# Patient Record
Sex: FEMALE | Race: WHITE | NOT HISPANIC OR LATINO | URBAN - METROPOLITAN AREA
[De-identification: names, ages, dates, MRNs, and addresses within clinical notes are randomized per-mention and may not be internally consistent; named-entity substitution may affect disease eponyms.]

---

## 2017-01-12 DIAGNOSIS — H53.10 SUBJECTIVE VISUAL DISTURBANCE: Primary | ICD-10-CM

## 2017-02-16 ENCOUNTER — OFFICE VISIT (OUTPATIENT)
Dept: OPHTHALMOLOGY | Facility: CLINIC | Age: 37
End: 2017-02-16
Attending: OPHTHALMOLOGY
Payer: MEDICARE

## 2017-02-16 DIAGNOSIS — H47.329 DRUSEN OF OPTIC DISC, UNSPECIFIED LATERALITY: Primary | ICD-10-CM

## 2017-02-16 DIAGNOSIS — H53.10 SUBJECTIVE VISUAL DISTURBANCE: ICD-10-CM

## 2017-02-16 PROCEDURE — 92083 EXTENDED VISUAL FIELD XM: CPT | Mod: ZF | Performed by: OPHTHALMOLOGY

## 2017-02-16 PROCEDURE — 99214 OFFICE O/P EST MOD 30 MIN: CPT | Mod: ZF

## 2017-02-16 PROCEDURE — 92133 CPTRZD OPH DX IMG PST SGM ON: CPT | Mod: ZF | Performed by: OPHTHALMOLOGY

## 2017-02-16 PROCEDURE — 92015 DETERMINE REFRACTIVE STATE: CPT | Mod: GY,ZF

## 2017-02-16 RX ORDER — VITAMIN E 268 MG
800 CAPSULE ORAL
COMMUNITY
Start: 2016-04-05

## 2017-02-16 RX ORDER — DROSPIRENONE AND ETHINYL ESTRADIOL 0.03MG-3MG
KIT ORAL
COMMUNITY
Start: 2016-08-19

## 2017-02-16 RX ORDER — EMOLLIENT BASE
CREAM (GRAM) TOPICAL
COMMUNITY
Start: 2013-11-15

## 2017-02-16 RX ORDER — DOCUSATE SODIUM 100 MG/1
CAPSULE, LIQUID FILLED ORAL
COMMUNITY
Start: 2016-08-15

## 2017-02-16 RX ORDER — ALBUTEROL SULFATE 0.83 MG/ML
2.5 SOLUTION RESPIRATORY (INHALATION)
COMMUNITY
Start: 2016-09-30

## 2017-02-16 RX ORDER — PROPRANOLOL HYDROCHLORIDE 10 MG/1
TABLET ORAL
COMMUNITY
Start: 2016-12-05

## 2017-02-16 RX ORDER — BACLOFEN 10 MG/1
10 TABLET ORAL
COMMUNITY
Start: 2016-02-05

## 2017-02-16 RX ORDER — SENNOSIDES 8.6 MG
TABLET ORAL
COMMUNITY
Start: 2016-08-23

## 2017-02-16 RX ORDER — CEFUROXIME AXETIL 250 MG/1
6 TABLET ORAL
COMMUNITY
Start: 2016-09-24

## 2017-02-16 RX ORDER — QUETIAPINE 150 MG/1
TABLET, FILM COATED, EXTENDED RELEASE ORAL
COMMUNITY
Start: 2017-01-11 | End: 2018-03-19

## 2017-02-16 RX ORDER — PHENELZINE SULFATE 15 MG/1
TABLET ORAL
COMMUNITY
Start: 2016-11-29

## 2017-02-16 RX ORDER — GABAPENTIN 300 MG/1
CAPSULE ORAL
COMMUNITY
Start: 2016-11-21

## 2017-02-16 RX ORDER — PRAVASTATIN SODIUM 40 MG
TABLET ORAL
COMMUNITY
Start: 2016-09-12

## 2017-02-16 RX ORDER — QUETIAPINE 300 MG/1
TABLET, FILM COATED, EXTENDED RELEASE ORAL
COMMUNITY
Start: 2017-01-11 | End: 2018-03-19

## 2017-02-16 RX ORDER — LEVOTHYROXINE SODIUM 75 UG/1
TABLET ORAL
COMMUNITY
Start: 2016-04-12

## 2017-02-16 RX ORDER — CYANOCOBALAMIN 1000 UG/ML
1000 INJECTION, SOLUTION INTRAMUSCULAR; SUBCUTANEOUS
COMMUNITY
Start: 2014-01-10

## 2017-02-16 RX ORDER — LORATADINE 10 MG/1
TABLET ORAL
COMMUNITY
Start: 2017-01-26 | End: 2018-03-19

## 2017-02-16 RX ORDER — QUETIAPINE 200 MG/1
TABLET, FILM COATED, EXTENDED RELEASE ORAL
COMMUNITY
Start: 2017-01-16 | End: 2018-03-19

## 2017-02-16 RX ORDER — LITHIUM CARBONATE 150 MG/1
CAPSULE ORAL
COMMUNITY
Start: 2016-11-29 | End: 2018-03-19

## 2017-02-16 RX ORDER — FLUTICASONE PROPIONATE 110 UG/1
1 AEROSOL, METERED RESPIRATORY (INHALATION)
COMMUNITY
Start: 2016-09-24

## 2017-02-16 RX ORDER — MELOXICAM 15 MG/1
TABLET ORAL
COMMUNITY
Start: 2016-09-10

## 2017-02-16 RX ORDER — CLOBETASOL PROPIONATE 0.5 MG/G
CREAM TOPICAL
COMMUNITY
Start: 2017-01-10

## 2017-02-16 ASSESSMENT — SLIT LAMP EXAM - LIDS
COMMENTS: NORMAL
COMMENTS: NORMAL

## 2017-02-16 ASSESSMENT — REFRACTION_WEARINGRX
OS_ADD: +2.25
OS_AXIS: 084
OS_CYLINDER: +0.50
OD_ADD: +2.25
OD_CYLINDER: +0.75
OD_SPHERE: -4.50
OD_AXIS: 083
OS_SPHERE: -4.25

## 2017-02-16 ASSESSMENT — REFRACTION_MANIFEST
OS_CYLINDER: +0.25
OD_CYLINDER: +1.00
OS_SPHERE: -4.25
OS_AXIS: 090
OD_SPHERE: -4.00
OD_AXIS: 085

## 2017-02-16 ASSESSMENT — TONOMETRY
OD_IOP_MMHG: 11
OS_IOP_MMHG: 10
IOP_METHOD: TONOPEN

## 2017-02-16 ASSESSMENT — CONF VISUAL FIELD
OD_INFERIOR_TEMPORAL_RESTRICTION: 1
OS_INFERIOR_NASAL_RESTRICTION: 3
OS_SUPERIOR_NASAL_RESTRICTION: 3
OS_INFERIOR_TEMPORAL_RESTRICTION: 3
OD_SUPERIOR_TEMPORAL_RESTRICTION: 1
OS_SUPERIOR_TEMPORAL_RESTRICTION: 3

## 2017-02-16 ASSESSMENT — EXTERNAL EXAM - RIGHT EYE: OD_EXAM: NORMAL

## 2017-02-16 ASSESSMENT — VISUAL ACUITY
METHOD: SNELLEN - LINEAR
OS_CC: 20/400
OD_CC: 20/400

## 2017-02-16 ASSESSMENT — EXTERNAL EXAM - LEFT EYE: OS_EXAM: NORMAL

## 2017-02-16 NOTE — NURSING NOTE
Chief Complaints and History of Present Illnesses   Patient presents with     Neurologic Problem     ON drusen     HPI    Affected eye(s):  Both   Symptoms:        Frequency:  Intermittent       Do you have eye pain now?:  No      Comments:  Alexandra Light is a 36 year old female with the following diagnoses:     1. Drusen of optic disc, unspecified laterality     2. Blurry vision   - Constant  - Current Rx over 1 year old.   - otherwise, no new concerns.     3. History of migraines  - Had b[facility] injections last Friday ([facility]   - Botox helped, but still c/o headaches.   - Does not sleep much. Sometimes goes 3-4 days without sleeping, and when she sleeps, it's only an hour.       Nai CANTU 1:54 PM February 16, 2017

## 2017-02-16 NOTE — MR AVS SNAPSHOT
After Visit Summary   2/16/2017    Alexandra Light    MRN: [id number]           Patient Information     Date Of Birth          1980        Visit Information        Provider Department      2/16/2017 2:00 PM aNin Danielle MD [facility]        Today's Diagnoses     Drusen of optic disc, unspecified laterality    -  1    Subjective visual disturbance           Follow-ups after your visit        Your next 10 appointments already scheduled     Feb 15, 2018  2:00 PM CST   RETURN NEURO with Nain Danielle MD   Eye Clinic ([facility])    [address]  [address]  [facility] [address]   [phone]              Who to contact     Please call your clinic at [phone] to:    Ask questions about your health    Make or cancel appointments    Discuss your medicines    Learn about your test results    Speak to your doctor   If you have compliments or concerns about an experience at your clinic, or if you wish to file a complaint, please contact [facility] Patient Relations at [phone] or email us at [email or web address]         Additional Information About Your Visit        MyChart Information     [employer] gives you secure access to your electronic health record. If you see a primary care provider, you can also send messages to your care team and make appointments. If you have questions, please call your primary care clinic.  If you do not have a primary care provider, please call [phone] and they will assist you.      [employer] is an electronic gateway that provides easy, online access to your medical records. With [employer], you can request a clinic appointment, read your test results, renew a prescription or communicate with your care team.     To access your existing account, please contact your [facility] or call [phone] for assistance.        Care EveryWhere ID      This is your Care EveryWhere ID. This could be used by other organizations to access your [facility] medical records  [id number]         Blood Pressure from Last 3 Encounters:   No data found for BP    Weight from Last 3 Encounters:   No data found for Wt              We Performed the Following     Color Vision - Screening OU (both eyes)     IOP Measurement     Low Vision Central OU     OCT Optic Nerve RNFL Spectralis OU (both eyes)          Today's Medication Changes          These changes are accurate as of: 2/16/17  3:45 PM.  If you have any questions, ask your nurse or doctor.               These medicines have changed or have updated prescriptions.        Dose/Directions    albuterol (2.5 MG/3ML) 0.083% neb solution   This may have changed:  Another medication with the same name was removed. Continue taking this medication, and follow the directions you see here.        Dose:  2.5 mg   Inhale 2.5 mg into the lungs   Refills:  0       ARIPiprazole 400 MG extended release injection   Commonly known as:  ABILIFY MAINTENA   This may have changed:  Another medication with the same name was removed. Continue taking this medication, and follow the directions you see here.        Dose:  300 mg   Inject 300 mg into the muscle   Refills:  0       cholecalciferol 1000 UNIT tablet   Commonly known as:  vitamin D   This may have changed:  Another medication with the same name was removed. Continue taking this medication, and follow the directions you see here.        TAKE ONE TABLET IN THE MORNING   Refills:  0       clobetasol 0.05 % cream   Commonly known as:  TEMOVATE   This may have changed:  Another medication with the same name was removed. Continue taking this medication, and follow the directions you see here.        Refills:  0       cyanocobalamin 1000 MCG/ML injection   Commonly known as:  VITAMIN B12   This may have changed:  Another medication with the same name was removed. Continue taking this medication, and  follow the directions you see here.        Dose:  1000 mcg   Inject 1,000 mcg into the muscle   Refills:  0       docusate sodium 100 MG capsule   Commonly known as:  COLACE   This may have changed:  Another medication with the same name was removed. Continue taking this medication, and follow the directions you see here.        TAKE ONE CAPSULE IN THE MORNING and TAKE ONE CAPSULE AT BEDTIME   Refills:  0       drospirenone-ethinyl estradiol 3-0.03 MG per tablet   Commonly known as:  GILBERT   This may have changed:  Another medication with the same name was removed. Continue taking this medication, and follow the directions you see here.        Take 1 Tab by mouth one time a day. ok TO SKIP THE placebo pills   Refills:  0       emollient cream   This may have changed:  Another medication with the same name was removed. Continue taking this medication, and follow the directions you see here.        Refills:  0       fluticasone 110 MCG/ACT Inhaler   Commonly known as:  FLOVENT HFA   This may have changed:  Another medication with the same name was removed. Continue taking this medication, and follow the directions you see here.        Dose:  1 puff   Inhale 1 puff into the lungs   Refills:  0       FULL KIT NEBULIZER SET Misc   This may have changed:  Another medication with the same name was removed. Continue taking this medication, and follow the directions you see here.        Refills:  0       GLYCERIN-POLYSORBATE 80 OP   This may have changed:  Another medication with the same name was removed. Continue taking this medication, and follow the directions you see here.        Dose:  2 drop   Inject 2 drops into the eye   Refills:  0       levothyroxine 75 MCG tablet   Commonly known as:  SYNTHROID/LEVOTHROID   This may have changed:  Another medication with the same name was removed. Continue taking this medication, and follow the directions you see here.        TAKE ONE TABLET IN THE MORNING   Refills:  0        lithium 150 MG capsule   This may have changed:  Another medication with the same name was removed. Continue taking this medication, and follow the directions you see here.        TAKE ONE CAPSULE IN THE EVENING and TAKE ONE CAPSULE AT BEDTIME WITH MEALS.   Refills:  0       loratadine 10 MG tablet   Commonly known as:  CLARITIN   This may have changed:  Another medication with the same name was removed. Continue taking this medication, and follow the directions you see here.        TAKE ONE TABLET IN THE MORNING   Refills:  0       metFORMIN 500 MG tablet   Commonly known as:  GLUCOPHAGE   This may have changed:  Another medication with the same name was removed. Continue taking this medication, and follow the directions you see here.        Dose:  250 mg   Take 250 mg by mouth   Refills:  0       omeprazole 20 MG CR capsule   Commonly known as:  priLOSEC   This may have changed:  Another medication with the same name was removed. Continue taking this medication, and follow the directions you see here.        TAKE ONE CAPSULE IN THE MORNING and TAKE ONE CAPSULE IN THE EVENING   Refills:  0       phenelzine 15 MG tablet   Commonly known as:  NARDIL   This may have changed:  Another medication with the same name was removed. Continue taking this medication, and follow the directions you see here.        TAKE ONE TABLET IN THE MORNING and TAKE ONE TABLET AT NOON and TAKE TWO TABLETS AT BEDTIME   Refills:  0       pravastatin 40 MG tablet   Commonly known as:  PRAVACHOL   This may have changed:  Another medication with the same name was removed. Continue taking this medication, and follow the directions you see here.        TAKE ONE TABLET AT BEDTIME   Refills:  0       propranolol 10 MG tablet   Commonly known as:  INDERAL   This may have changed:  Another medication with the same name was removed. Continue taking this medication, and follow the directions you see here.        TAKE 1-2 TABLETS THREE TIMES DAILY AS  NEEDED FOR ANXIETY OR PALPITATIONS AS LONG AS BLOOD PRESSURE IS OK.   Refills:  0       sennosides 8.6 MG tablet   Commonly known as:  SENOKOT   This may have changed:  Another medication with the same name was removed. Continue taking this medication, and follow the directions you see here.        TAKE ONE TABLET IN THE MORNING and TAKE ONE TABLET IN THE EVENING   Refills:  0       * SEROQUEL  MG Tb24 24 hr tablet   This may have changed:  Another medication with the same name was removed. Continue taking this medication, and follow the directions you see here.   Generic drug:  QUEtiapine        TAKE ONE TABLET AT BEDTIME WITH TWO 300MG TABS FOR TOTAL DOSE OF 750MG   Refills:  0       * SEROQUEL  MG 24 hr tablet   This may have changed:  Another medication with the same name was removed. Continue taking this medication, and follow the directions you see here.   Generic drug:  QUEtiapine        TAKE TWO TABLETS AT BEDTIME WITH 150MG TAB FOR TOTAL DOSE OF 750MG   Refills:  0       * SEROQUEL  MG 24 hr tablet   This may have changed:  Another medication with the same name was removed. Continue taking this medication, and follow the directions you see here.   Generic drug:  QUEtiapine        TAKE ONE TABLET IN THE MORNING .SWALLOW WHOLE; DO NOT BREAK,CRUSH,CHEW.   Refills:  0       SUMAtriptan 6 MG/0.5ML pen injector kit   Commonly known as:  IMITREX STATDOSE   This may have changed:  Another medication with the same name was removed. Continue taking this medication, and follow the directions you see here.        Dose:  6 mg   Inject 6 mg Subcutaneous   Refills:  0       * Notice:  This list has 3 medication(s) that are the same as other medications prescribed for you. Read the directions carefully, and ask your doctor or other care provider to review them with you.      Stop taking these medicines if you haven't already. Please contact your care team if you have questions.     carboxymethylcellulose  1 % ophthalmic solution   Commonly known as:  CELLUVISC/REFRESH LIQUIGEL           fish oil-omega-3 fatty acids 1000 MG capsule           gabapentin 600 MG 24 hr tablet   Commonly known as:  GRALISE           HYDROcodone-acetaminophen 5-325 MG per tablet   Commonly known as:  NORCO           lidocaine 5 % Patch   Commonly known as:  LIDODERM           magnesium 100 MG Tabs           MODAFINIL PO           Multi-vitamin Tabs tablet           NABUMETONE PO           orphenadrine 100 MG 12 hr tablet   Commonly known as:  NORFLEX           pirbuterol 200 MCG/INH Inhaler   Commonly known as:  MAXAIR           senna-docusate 8.6-50 MG per tablet   Commonly known as:  SENOKOT-S;PERICOLACE           Sodium Fluoride 1.1 % Crea           VISTARIL 25 MG capsule   Generic drug:  hydrOXYzine                    Primary Care Provider Office Phone # Fax #    Violetta Enriquez [phone]       [facility] [address] [address]        Thank you!     Thank you for choosing [facility]  for your care. Our goal is always to provide you with excellent care. Hearing back from our patients is one way we can continue to improve our services. Please take a few minutes to complete the written survey that you may receive in the mail after your visit with us. Thank you!             Your Updated Medication List - Protect others around you: Learn how to safely use, store and throw away your medicines at www.disposemymeds.org.          This list is accurate as of: 2/16/17  3:45 PM.  Always use your most recent med list.                   Brand Name Dispense Instructions for use    albuterol (2.5 MG/3ML) 0.083% neb solution      Inhale 2.5 mg into the lungs       ARIPiprazole 400 MG extended release injection    ABILIFY MAINTENA     Inject 300 mg into the muscle       baclofen 10 MG tablet    LIORESAL     Take 10 mg by mouth       cholecalciferol 1000 UNIT tablet    vitamin D     TAKE ONE TABLET  IN THE MORNING       clobetasol 0.05 % cream    TEMOVATE         cyanocobalamin 1000 MCG/ML injection    VITAMIN B12     Inject 1,000 mcg into the muscle       docusate sodium 100 MG capsule    COLACE     TAKE ONE CAPSULE IN THE MORNING and TAKE ONE CAPSULE AT BEDTIME       drospirenone-ethinyl estradiol 3-0.03 MG per tablet    GILBERT     Take 1 Tab by mouth one time a day. ok TO SKIP THE placebo pills       emollient cream          fluticasone 110 MCG/ACT Inhaler    FLOVENT HFA     Inhale 1 puff into the lungs       FULL KIT NEBULIZER SET Misc          gabapentin 300 MG capsule    NEURONTIN     TAKE FOUR CAPSULES IN THE MORNING and TAKE FOUR CAPSULES IN THE EVENING and TAKE FOUR CAPSULES AT BEDTIME       GLYCERIN-POLYSORBATE 80 OP      Inject 2 drops into the eye       levothyroxine 75 MCG tablet    SYNTHROID/LEVOTHROID     TAKE ONE TABLET IN THE MORNING       lithium 150 MG capsule      TAKE ONE CAPSULE IN THE EVENING and TAKE ONE CAPSULE AT BEDTIME WITH MEALS.       loratadine 10 MG tablet    CLARITIN     TAKE ONE TABLET IN THE MORNING       MAGNESIUM PO          meloxicam 15 MG tablet    MOBIC     TAKE ONE TABLET EVERY DAY WITH FOOD       metFORMIN 500 MG tablet    GLUCOPHAGE     Take 250 mg by mouth       omeprazole 20 MG CR capsule    priLOSEC     TAKE ONE CAPSULE IN THE MORNING and TAKE ONE CAPSULE IN THE EVENING       phenelzine 15 MG tablet    NARDIL     TAKE ONE TABLET IN THE MORNING and TAKE ONE TABLET AT NOON and TAKE TWO TABLETS AT BEDTIME       pravastatin 40 MG tablet    PRAVACHOL     TAKE ONE TABLET AT BEDTIME       propranolol 10 MG tablet    INDERAL     TAKE 1-2 TABLETS THREE TIMES DAILY AS NEEDED FOR ANXIETY OR PALPITATIONS AS LONG AS BLOOD PRESSURE IS OK.       sennosides 8.6 MG tablet    SENOKOT     TAKE ONE TABLET IN THE MORNING and TAKE ONE TABLET IN THE EVENING       * SEROQUEL  MG Tb24 24 hr tablet   Generic drug:  QUEtiapine      TAKE ONE TABLET AT BEDTIME WITH TWO 300MG TABS FOR  TOTAL DOSE OF 750MG       * SEROQUEL  MG 24 hr tablet   Generic drug:  QUEtiapine      TAKE TWO TABLETS AT BEDTIME WITH 150MG TAB FOR TOTAL DOSE OF 750MG       * SEROQUEL  MG 24 hr tablet   Generic drug:  QUEtiapine      TAKE ONE TABLET IN THE MORNING .SWALLOW WHOLE; DO NOT BREAK,CRUSH,CHEW.       SUMAtriptan 6 MG/0.5ML pen injector kit    IMITREX STATDOSE     Inject 6 mg Subcutaneous       vitamin E 400 UNIT capsule      Take 800 Units by mouth       * Notice:  This list has 3 medication(s) that are the same as other medications prescribed for you. Read the directions carefully, and ask your doctor or other care provider to review them with you.

## 2017-02-16 NOTE — PROGRESS NOTES
Assessment & Plan     Alexandra Light is a 36 year old female with the following diagnoses:   1. Drusen of optic disc, unspecified laterality    2. Subjective visual disturbance         The patient reports no change in her vision from last exam, although her vision appear worse in both eyes compared to last visit but better that her prior visit in Apr 2015.       Her examination today showed no afferent or efferent defect. Fundus exam is unremarkable.     She has nonorganic vision loss. Continued reassurance.     Attending Physician Attestation:  I have seen and examined this patient.  I have confirmed and edited as necessary the chief complaint(s), history of present illness, review of systems, relevant history, and examination findings as documented by others.  I have personally reviewed the relevant tests, images, and reports as documented above.  I have confirmed and edited as necessary the assessment and plan and agree with this note.  - Nain Danielle MD 4:00 PM 2/16/2017

## 2017-09-10 ENCOUNTER — HEALTH MAINTENANCE LETTER (OUTPATIENT)
Age: 37
End: 2017-09-10

## 2018-03-15 DIAGNOSIS — H53.10 SUBJECTIVE VISUAL DISTURBANCE: Primary | ICD-10-CM

## 2018-03-19 ENCOUNTER — OFFICE VISIT (OUTPATIENT)
Dept: OPHTHALMOLOGY | Facility: CLINIC | Age: 38
End: 2018-03-19
Attending: OPHTHALMOLOGY
Payer: MEDICARE

## 2018-03-19 DIAGNOSIS — H47.329 DRUSEN OF OPTIC DISC, UNSPECIFIED LATERALITY: Primary | ICD-10-CM

## 2018-03-19 DIAGNOSIS — H53.10 SUBJECTIVE VISUAL DISTURBANCE: ICD-10-CM

## 2018-03-19 PROCEDURE — 92083 EXTENDED VISUAL FIELD XM: CPT | Mod: ZF | Performed by: OPHTHALMOLOGY

## 2018-03-19 PROCEDURE — 92133 CPTRZD OPH DX IMG PST SGM ON: CPT | Mod: ZF | Performed by: OPHTHALMOLOGY

## 2018-03-19 PROCEDURE — G0463 HOSPITAL OUTPT CLINIC VISIT: HCPCS | Mod: ZF

## 2018-03-19 ASSESSMENT — REFRACTION_WEARINGRX
OD_CYLINDER: +0.75
OS_AXIS: 084
OD_AXIS: 083
OD_ADD: +2.25
OD_SPHERE: -4.50
OS_ADD: +2.25
OS_CYLINDER: +0.50
OS_SPHERE: -4.25

## 2018-03-19 ASSESSMENT — CONF VISUAL FIELD
OD_SUPERIOR_NASAL_RESTRICTION: 3
OS_INFERIOR_NASAL_RESTRICTION: 3
OS_INFERIOR_TEMPORAL_RESTRICTION: 3
OS_SUPERIOR_NASAL_RESTRICTION: 3
OD_INFERIOR_TEMPORAL_RESTRICTION: 1
OD_SUPERIOR_TEMPORAL_RESTRICTION: 1
OS_SUPERIOR_TEMPORAL_RESTRICTION: 3
OD_INFERIOR_NASAL_RESTRICTION: 3

## 2018-03-19 ASSESSMENT — TONOMETRY
IOP_METHOD: ICARE
OS_IOP_MMHG: 9
OD_IOP_MMHG: 9

## 2018-03-19 ASSESSMENT — VISUAL ACUITY
CORRECTION_TYPE: GLASSES
METHOD: SNELLEN - LINEAR
OD_CC: 20/400
OS_CC: 20/500

## 2018-03-19 ASSESSMENT — SLIT LAMP EXAM - LIDS
COMMENTS: NORMAL
COMMENTS: NORMAL

## 2018-03-19 ASSESSMENT — EXTERNAL EXAM - RIGHT EYE: OD_EXAM: NORMAL

## 2018-03-19 ASSESSMENT — EXTERNAL EXAM - LEFT EYE: OS_EXAM: NORMAL

## 2018-03-19 NOTE — NURSING NOTE
Chief Complaints and History of Present Illnesses   Patient presents with     Neurologic Problem     annual exam      HPI    Symptoms:              Comments:  Alexandra Light is a 37 year old female with the following diagnoses:   1. Drusen of optic disc, unspecified laterality   2. Subjective visual disturbance     Feels no significant change in vision since the last visit. Some days the vision appears worse.     Nai CANTU 1:08 PM March 19, 2018

## 2018-03-19 NOTE — MR AVS SNAPSHOT
After Visit Summary   3/19/2018    Alexandra Light    MRN: [id number]           Patient Information     Date Of Birth          1980        Visit Information        Provider Department      3/19/2018 1:00 PM Nain Danielle MD [facility]        Today's Diagnoses     Drusen of optic disc, unspecified laterality    -  1    Subjective visual disturbance           Follow-ups after your visit        Additional Services     OCCUPATIONAL THERAPY REFERRAL       Low Vision Referral to Tatiana Loera                  Your next 10 appointments already scheduled     Mar 19, 2019  1:00 PM CDT   RETURN NEURO with Nain Danielle MD   Eye Clinic ([facility])    [address]  [facility] [address]   [phone]              Who to contact     Please call your clinic at [phone] to:    Ask questions about your health    Make or cancel appointments    Discuss your medicines    Learn about your test results    Speak to your doctor            Additional Information About Your Visit        MyChart Information     [employer] gives you secure access to your electronic health record. If you see a primary care provider, you can also send messages to your care team and make appointments. If you have questions, please call your primary care clinic.  If you do not have a primary care provider, please call [phone] and they will assist you.      [employer] is an electronic gateway that provides easy, online access to your medical records. With [employer], you can request a clinic appointment, read your test results, renew a prescription or communicate with your care team.     To access your existing account, please contact your [facility] or call [phone] for assistance.        Care EveryWhere ID     This is your Care EveryWhere ID. This could be used by other organizations to access your [facility]  records  [id number]         Blood Pressure from Last 3 Encounters:   No data found for BP    Weight from Last 3 Encounters:   No data found for Wt              We Performed the Following     DILATED FUNDUS EXAM     IOP Measurement     Low Vision Central OU     OCCUPATIONAL THERAPY REFERRAL     OCT Optic Nerve RNFL Spectralis OU (both eyes)          Today's Medication Changes          These changes are accurate as of 3/19/18  2:41 PM.  If you have any questions, ask your nurse or doctor.               Stop taking these medicines if you haven't already. Please contact your care team if you have questions.     ARIPiprazole  MG extended release injection   Commonly known as:  ABILIFY MAINTENA   Stopped by:  Nain Danielle MD           GLYCERIN-POLYSORBATE 80 OP   Stopped by:  Nain Danielle MD           lithium 150 MG capsule   Stopped by:  Nain Danielle MD           loratadine 10 MG tablet   Commonly known as:  CLARITIN   Stopped by:  Nain Danielle MD           SEROQUEL  MG Tb24 24 hr tablet   Generic drug:  QUEtiapine   Stopped by:  Nain Danielle MD           SEROQUEL  MG 24 hr tablet   Generic drug:  QUEtiapine   Stopped by:  Nain Danielle MD           SEROQUEL  MG 24 hr tablet   Generic drug:  QUEtiapine   Stopped by:  Nain Danielle MD                    Primary Care Provider Office Phone # Fax #    Violetta Enriquez [phone]       [facility] [address] [address]        Equal Access to Services     [facility] AH: Hadii aad ku hadasho Soomaali, waaxda luqadaha, qaybta kaalmada adeegyada, wax mouna haybreana [place] catherine . So [facility] [phone].    ATENCIÓN: Si habla español, tiene a haley disposición servicios gratuitos de asistencia lingüística. Llame al [phone].    We comply with applicable federal civil rights laws and [place] laws. We do not  discriminate on the basis of race, color, national origin, age, disability, sex, sexual orientation, or gender identity.            Thank you!     Thank you for choosing [facility]  for your care. Our goal is always to provide you with excellent care. Hearing back from our patients is one way we can continue to improve our services. Please take a few minutes to complete the written survey that you may receive in the mail after your visit with us. Thank you!             Your Updated Medication List - Protect others around you: Learn how to safely use, store and throw away your medicines at www.disposemymeds.org.          This list is accurate as of 3/19/18  2:41 PM.  Always use your most recent med list.                   Brand Name Dispense Instructions for use Diagnosis    albuterol (2.5 MG/3ML) 0.083% neb solution      Inhale 2.5 mg into the lungs        baclofen 10 MG tablet    LIORESAL     Take 10 mg by mouth        cholecalciferol 1000 UNIT tablet    vitamin D3     TAKE ONE TABLET IN THE MORNING        clobetasol 0.05 % cream    TEMOVATE          CLOZAPINE PO    CLOZARIL          cyanocobalamin 1000 MCG/ML injection    VITAMIN B12     Inject 1,000 mcg into the muscle        docusate sodium 100 MG capsule    COLACE     TAKE ONE CAPSULE IN THE MORNING and TAKE ONE CAPSULE AT BEDTIME        drospirenone-ethinyl estradiol 3-0.03 MG per tablet    GILBERT     Take 1 Tab by mouth one time a day. ok TO SKIP THE placebo pills        emollient cream           fluticasone 110 MCG/ACT Inhaler    FLOVENT HFA     Inhale 1 puff into the lungs        FULL KIT NEBULIZER SET Misc           gabapentin 300 MG capsule    NEURONTIN     TAKE FOUR CAPSULES IN THE MORNING and TAKE FOUR CAPSULES IN THE EVENING and TAKE FOUR CAPSULES AT BEDTIME        levothyroxine 75 MCG tablet    SYNTHROID/LEVOTHROID     TAKE ONE TABLET IN THE MORNING        MAGNESIUM PO           meloxicam 15 MG tablet    MOBIC     TAKE ONE TABLET EVERY DAY WITH FOOD         metFORMIN 500 MG tablet    GLUCOPHAGE     Take 250 mg by mouth        omeprazole 20 MG CR capsule    priLOSEC     TAKE ONE CAPSULE IN THE MORNING and TAKE ONE CAPSULE IN THE EVENING        phenelzine 15 MG tablet    NARDIL     TAKE ONE TABLET IN THE MORNING and TAKE ONE TABLET AT NOON and TAKE TWO TABLETS AT BEDTIME        pravastatin 40 MG tablet    PRAVACHOL     TAKE ONE TABLET AT BEDTIME        propranolol 10 MG tablet    INDERAL     TAKE 1-2 TABLETS THREE TIMES DAILY AS NEEDED FOR ANXIETY OR PALPITATIONS AS LONG AS BLOOD PRESSURE IS OK.        sennosides 8.6 MG tablet    SENOKOT     TAKE ONE TABLET IN THE MORNING and TAKE ONE TABLET IN THE EVENING        SUMAtriptan 6 MG/0.5ML pen injector kit    IMITREX STATDOSE     Inject 6 mg Subcutaneous        vitamin E 400 UNIT capsule      Take 800 Units by mouth

## 2018-03-19 NOTE — PROGRESS NOTES
Assessment & Plan     Alexandra Light is a 36 year old female with the following diagnoses:   1. Drusen of optic disc, unspecified laterality    2. Subjective visual disturbance       Followup for nonorganic vision loss beginning 2014. Glasses updated last visit  The patient reports no change in her vision from last exam, although her vision appear worse in both eyes compared to last visit but better that her prior visit in Apr 2015.     Her examination today showed no afferent or efferent defect. Fundus exam is unremarkable.     She has nonorganic vision loss. Continued reassurance. She wants to try esight glasses.  Will have her see Tatiana Loera again.             Attending Physician Attestation:  Complete documentation of historical and exam elements from today's encounter can be found in the full encounter summary report (not reduplicated in this progress note).  I personally obtained the chief complaint(s) and history of present illness.  I confirmed and edited as necessary the review of systems, past medical/surgical history, family history, social history, and examination findings as documented by others; and I examined the patient myself.  I personally reviewed the relevant tests, images, and reports as documented above.  I formulated and edited as necessary the assessment and plan and discussed the findings and management plan with the patient and family. - Nain Mera MD  PGY3, Dept of Ophthalmology  Pager [phone]

## 2019-06-06 ENCOUNTER — OFFICE VISIT (OUTPATIENT)
Dept: OPHTHALMOLOGY | Facility: CLINIC | Age: 39
End: 2019-06-06
Attending: OPHTHALMOLOGY
Payer: MEDICARE

## 2019-06-06 DIAGNOSIS — H53.40 VISUAL FIELD DEFECT: Primary | ICD-10-CM

## 2019-06-06 DIAGNOSIS — H53.10 SUBJECTIVE VISUAL DISTURBANCE: ICD-10-CM

## 2019-06-06 DIAGNOSIS — H53.10 SUBJECTIVE VISUAL DISTURBANCE: Primary | ICD-10-CM

## 2019-06-06 PROCEDURE — 92133 CPTRZD OPH DX IMG PST SGM ON: CPT | Mod: ZF | Performed by: OPHTHALMOLOGY

## 2019-06-06 PROCEDURE — G0463 HOSPITAL OUTPT CLINIC VISIT: HCPCS | Mod: ZF

## 2019-06-06 PROCEDURE — 92083 EXTENDED VISUAL FIELD XM: CPT | Mod: ZF | Performed by: OPHTHALMOLOGY

## 2019-06-06 RX ORDER — ASENAPINE 5 MG/1
TABLET SUBLINGUAL 2 TIMES DAILY
COMMUNITY

## 2019-06-06 ASSESSMENT — REFRACTION_WEARINGRX
OD_AXIS: 083
OD_ADD: +2.25
OD_CYLINDER: +0.75
OD_SPHERE: -4.50
OS_ADD: +2.25
OS_SPHERE: -4.25
OS_AXIS: 084
OS_CYLINDER: +0.50

## 2019-06-06 ASSESSMENT — VISUAL ACUITY
OD_CC: 20/400
OS_CC: 20/300
METHOD: SNELLEN - LINEAR
OD_PH_CC: 20/300
CORRECTION_TYPE: GLASSES

## 2019-06-06 ASSESSMENT — TONOMETRY
OD_IOP_MMHG: 11
OS_IOP_MMHG: 13
IOP_METHOD: ICARE

## 2019-06-06 ASSESSMENT — SLIT LAMP EXAM - LIDS
COMMENTS: NORMAL
COMMENTS: NORMAL

## 2019-06-06 ASSESSMENT — CONF VISUAL FIELD
OS_INFERIOR_TEMPORAL_RESTRICTION: 3
OS_INFERIOR_NASAL_RESTRICTION: 3
OD_SUPERIOR_TEMPORAL_RESTRICTION: 3
OS_SUPERIOR_NASAL_RESTRICTION: 3
OD_INFERIOR_TEMPORAL_RESTRICTION: 3
OS_SUPERIOR_TEMPORAL_RESTRICTION: 3
OD_SUPERIOR_NASAL_RESTRICTION: 3
METHOD: COUNTING FINGERS
OD_INFERIOR_NASAL_RESTRICTION: 3

## 2019-06-06 ASSESSMENT — EXTERNAL EXAM - RIGHT EYE: OD_EXAM: NORMAL

## 2019-06-06 ASSESSMENT — CUP TO DISC RATIO
OD_RATIO: 0.3
OS_RATIO: 0.3

## 2019-06-06 ASSESSMENT — EXTERNAL EXAM - LEFT EYE: OS_EXAM: NORMAL

## 2019-06-06 NOTE — NURSING NOTE
Chief Complaints and History of Present Illnesses   Patient presents with     Follow Up      Chief Complaint(s) and History of Present Illness(es)     Follow Up     Laterality: both eyes    Quality: blurred vision    Severity: moderate    Frequency: constantly    Associated symptoms: dryness.  Negative for eye pain, redness, tearing and pain with eye movement              Comments     3 month follow up of optic drusen unspecified laterality.  Patient complains of no change with blurred vision both eyes over the last 3 months.  Some dry eyes.  Uses AT's.  Says they help with the dryness each eye.  Eye meds: artificial tears each eye   PEPE Romero 6/6/2019 1:08 PM

## 2019-06-06 NOTE — PROGRESS NOTES
Assessment & Plan     Alexandra Light is a 38 year old female with the following diagnoses:   1. Subjective visual disturbance       2. Drusen of optic disc, right eye    Alexandra Light is a 38 year old female who presents for follow up for nonorganic vision loss. Last visit was 3 months ago (3/19/18). At that time we found Ms. Light to have nonorganic vision loss, and we reassured her and recommended trying eglasses. Today, patient notes that she did not get a chance to try on the eglasses due to the cost. She notes her vision has been stable from the last appointment. Complains of some blurry vision but no double vision. No flashes or floaters    Examination today showed a best corrected visual acuity was 20/300 in the right eye and 20/300 in the left eye. Intraocular pressure was 11 in the right eye and 13 in the left eye. Ishihara color plates were 5/11 in the right eye and 4/11 in the left eye. Slit lamp exam was unremarkable. Fundus exam showed stable drusen in the right optic disc, left optic disc was healthy and normal. Visual fields were unreliable with high false negatives. OCT showed a retinal nerve fiber layer (RNFL) thickness of 93` in the right eye and 104 in the left eye, stable from last time. These results were reviewed in clinic.     In summary, Alexandra Light is a 38 year old female who presents for follow up regarding nonorganic vision loss. Today her exam is stable from 3 months ago.     Return to clinic as needed . If symptoms worsen or change, please do not hesitate to return earlier.            Attending Physician Attestation:  Complete documentation of historical and exam elements from today's encounter can be found in the full encounter summary report (not reduplicated in this progress note).  I personally obtained the chief complaint(s) and history of present illness.  I confirmed and edited as necessary the review of systems, past medical/surgical history, family history, social history, and  examination findings as documented by others; and I examined the patient myself.  I personally reviewed the relevant tests, images, and reports as documented above.  I formulated and edited as necessary the assessment and plan and discussed the findings and management plan with the patient and family. - Nain Maldonado MS4  6/6/2019 at 2:34 PM

## 2019-11-08 ENCOUNTER — HEALTH MAINTENANCE LETTER (OUTPATIENT)
Age: 39
End: 2019-11-08

## 2020-02-23 ENCOUNTER — HEALTH MAINTENANCE LETTER (OUTPATIENT)
Age: 40
End: 2020-02-23

## 2020-06-03 ENCOUNTER — TELEPHONE (OUTPATIENT)
Dept: OPHTHALMOLOGY | Facility: CLINIC | Age: 40
End: 2020-06-03

## 2020-06-03 NOTE — TELEPHONE ENCOUNTER
Left message for patient.  Upcoming appointment with Dr. Danielle has been canceled due to concerns of COVID-19 and the safety of our patients.  Left main appointment line phone number in order to reschedule for any available return neuro in July or August.      Tiffani Arias on 6/3/2020 at 1:39 PM

## 2020-06-03 NOTE — LETTER
Tiffanie 3, 2020         Alexandra JHONATHAN Antoniopoonam  [address]    [place] [address]      Patient: Alexandra Light  : 1980   MRN: [id number]       Dear Alexandra:    We attempted to reach you by telephone regarding your appointment on 2020 with Dr. Danielle.  Unfortunately, your appointment has been canceled at this time as we are limiting the patients we are seeing in clinic due to concern for COVID-19.  Please contact our appointment line at [phone] to reschedule in July or August.        Please feel free to contact our office with any questions or concerns.      Sincerely,         Nain Danielle MD   Department of Ophthalmology   [facility]

## 2020-07-16 ENCOUNTER — OFFICE VISIT (OUTPATIENT)
Dept: OPHTHALMOLOGY | Facility: CLINIC | Age: 40
End: 2020-07-16
Attending: OPHTHALMOLOGY
Payer: MEDICARE

## 2020-07-16 DIAGNOSIS — H53.40 VISUAL FIELD DEFECT: Primary | ICD-10-CM

## 2020-07-16 DIAGNOSIS — H53.40 VISUAL FIELD DEFECT: ICD-10-CM

## 2020-07-16 PROCEDURE — G0463 HOSPITAL OUTPT CLINIC VISIT: HCPCS | Mod: ZF | Performed by: TECHNICIAN/TECHNOLOGIST

## 2020-07-16 PROCEDURE — 92133 CPTRZD OPH DX IMG PST SGM ON: CPT | Mod: ZF | Performed by: OPHTHALMOLOGY

## 2020-07-16 PROCEDURE — 92083 EXTENDED VISUAL FIELD XM: CPT | Mod: ZF | Performed by: OPHTHALMOLOGY

## 2020-07-16 ASSESSMENT — REFRACTION_WEARINGRX
OD_SPHERE: -4.50
OS_ADD: +2.25
OD_AXIS: 083
OS_SPHERE: -4.25
OD_CYLINDER: +0.75
OS_CYLINDER: +0.50
OS_AXIS: 084
OD_ADD: +2.25

## 2020-07-16 ASSESSMENT — VISUAL ACUITY
METHOD: SNELLEN - LINEAR
OD_CC: 20/400
CORRECTION_TYPE: GLASSES
OS_CC: 20/400

## 2020-07-16 ASSESSMENT — CONF VISUAL FIELD
OS_INFERIOR_NASAL_RESTRICTION: 3
OD_INFERIOR_NASAL_RESTRICTION: 3
OS_SUPERIOR_NASAL_RESTRICTION: 3
OD_SUPERIOR_NASAL_RESTRICTION: 3
OD_SUPERIOR_TEMPORAL_RESTRICTION: 3
OD_INFERIOR_TEMPORAL_RESTRICTION: 3
OS_INFERIOR_TEMPORAL_RESTRICTION: 3
OS_SUPERIOR_TEMPORAL_RESTRICTION: 3
METHOD: COUNTING FINGERS

## 2020-07-16 ASSESSMENT — TONOMETRY
OD_IOP_MMHG: 11
IOP_METHOD: ICARE
OS_IOP_MMHG: 10

## 2020-07-16 ASSESSMENT — SLIT LAMP EXAM - LIDS
COMMENTS: NORMAL
COMMENTS: NORMAL

## 2020-07-16 ASSESSMENT — EXTERNAL EXAM - RIGHT EYE: OD_EXAM: NORMAL

## 2020-07-16 ASSESSMENT — CUP TO DISC RATIO
OS_RATIO: 0.3
OD_RATIO: 0.3

## 2020-07-16 ASSESSMENT — EXTERNAL EXAM - LEFT EYE: OS_EXAM: NORMAL

## 2020-07-16 NOTE — PROGRESS NOTES
Assessment & Plan     Alexandra Light is a 39 year old female with the following diagnoses:   1. Visual field defect       Alexandra Light is a 38 year old female who presents for follow up for nonorganic vision loss. Last visit was 6/6/2019 and her exam was stable.    Her visual acuity is 20/400 in both eyes    VF LVC : generalized depression in both eyes (similar to VF done in 6/2019)    Overall, she is stable. She has nonorganic vision loss both eyes.             Attending Physician Attestation:  Complete documentation of historical and exam elements from today's encounter can be found in the full encounter summary report (not reduplicated in this progress note).  I personally obtained the chief complaint(s) and history of present illness.  I confirmed and edited as necessary the review of systems, past medical/surgical history, family history, social history, and examination findings as documented by others; and I examined the patient myself.  I personally reviewed the relevant tests, images, and reports as documented above.  I formulated and edited as necessary the assessment and plan and discussed the findings and management plan with the patient and family. I personally reviewed the ophthalmic test(s) associated with this encounter, agree with the interpretation(s) as documented by the resident/fellow, and have edited the corresponding report(s) as necessary.  - Nain Lopez MD  Neuro-ophthalmology fellow  [facility]

## 2020-07-16 NOTE — NURSING NOTE
Chief Complaint(s) and History of Present Illness(es)     Follow Up     In both eyes (nonorganic vision loss).  Associated symptoms include headache.              Comments     Patient states no new changes in vision each eye. Reports headaches.     PEPE Browne 7/16/2020 12:50 PM

## 2020-12-06 ENCOUNTER — HEALTH MAINTENANCE LETTER (OUTPATIENT)
Age: 40
End: 2020-12-06

## 2021-04-11 ENCOUNTER — HEALTH MAINTENANCE LETTER (OUTPATIENT)
Age: 41
End: 2021-04-11

## 2021-07-22 ENCOUNTER — OFFICE VISIT (OUTPATIENT)
Dept: OPHTHALMOLOGY | Facility: CLINIC | Age: 41
End: 2021-07-22
Attending: OPHTHALMOLOGY
Payer: MEDICARE

## 2021-07-22 DIAGNOSIS — H53.40 VISUAL FIELD DEFECT: ICD-10-CM

## 2021-07-22 PROCEDURE — G0463 HOSPITAL OUTPT CLINIC VISIT: HCPCS

## 2021-07-22 PROCEDURE — 92133 CPTRZD OPH DX IMG PST SGM ON: CPT | Performed by: OPHTHALMOLOGY

## 2021-07-22 PROCEDURE — 92014 COMPRE OPH EXAM EST PT 1/>: CPT | Performed by: OPHTHALMOLOGY

## 2021-07-22 PROCEDURE — 92083 EXTENDED VISUAL FIELD XM: CPT | Performed by: OPHTHALMOLOGY

## 2021-07-22 ASSESSMENT — TONOMETRY
OS_IOP_MMHG: 9
IOP_METHOD: ICARE
OD_IOP_MMHG: 9

## 2021-07-22 ASSESSMENT — SLIT LAMP EXAM - LIDS
COMMENTS: NORMAL
COMMENTS: NORMAL

## 2021-07-22 ASSESSMENT — REFRACTION_WEARINGRX
OS_CYLINDER: +0.50
OS_SPHERE: -4.25
OS_ADD: +2.25
OD_ADD: +2.25
OS_AXIS: 084
OD_CYLINDER: +0.75
OD_AXIS: 083
OD_SPHERE: -4.50

## 2021-07-22 ASSESSMENT — VISUAL ACUITY
CORRECTION_TYPE: GLASSES
OS_CC: 20/300
OD_CC: 20/400
METHOD: SNELLEN - LINEAR

## 2021-07-22 ASSESSMENT — CONF VISUAL FIELD
OS_NORMAL: 1
OD_NORMAL: 1

## 2021-07-22 ASSESSMENT — EXTERNAL EXAM - LEFT EYE: OS_EXAM: NORMAL

## 2021-07-22 ASSESSMENT — CUP TO DISC RATIO
OS_RATIO: 0.3
OD_RATIO: 0.3

## 2021-07-22 ASSESSMENT — EXTERNAL EXAM - RIGHT EYE: OD_EXAM: NORMAL

## 2021-07-22 NOTE — NURSING NOTE
Chief Complaints and History of Present Illnesses   Patient presents with     Blurred Vision Follow-Up     Chief Complaint(s) and History of Present Illness(es)     Blurred Vision Follow-Up               Comments     Alexandra Light is a 40 year old female with the following diagnoses:   1. Visual field defect      No vision changes since the last visit.     Nai CANTU 12:51 PM July 22, 2021

## 2021-07-22 NOTE — PROGRESS NOTES
Assessment & Plan     Alexandra Light is a 39 year old female with the following diagnoses:   1. Visual field defect       Alexandra Light is a 38 year old female who presents for follow up for nonorganic vision loss. Last visit was 7/16/2020 and her exam was stable. Today, she denies new vision loss, double vision, eye pain, flashes/floaters.    Her visual acuity is 20/400 right eye and 20/300 left eye.     VF LVC : generalized depression in both eyes (similar to VF done in 7/2020)    Overall, she is stable. She has nonorganic vision loss both eyes.             Attending Physician Attestation:  Complete documentation of historical and exam elements from today's encounter can be found in the full encounter summary report (not reduplicated in this progress note).  I personally obtained the chief complaint(s) and history of present illness.  I confirmed and edited as necessary the review of systems, past medical/surgical history, family history, social history, and examination findings as documented by others; and I examined the patient myself.  I personally reviewed the relevant tests, images, and reports as documented above.  I formulated and edited as necessary the assessment and plan and discussed the findings and management plan with the patient and family. I personally reviewed the ophthalmic test(s) associated with this encounter, agree with the interpretation(s) as documented by the resident/fellow, and have edited the corresponding report(s) as necessary.  - Nain Coppola MD  Ophthalmology Resident, PGY-3

## 2021-09-25 ENCOUNTER — HEALTH MAINTENANCE LETTER (OUTPATIENT)
Age: 41
End: 2021-09-25

## 2022-05-07 ENCOUNTER — HEALTH MAINTENANCE LETTER (OUTPATIENT)
Age: 42
End: 2022-05-07

## 2022-07-28 ENCOUNTER — OFFICE VISIT (OUTPATIENT)
Dept: OPHTHALMOLOGY | Facility: CLINIC | Age: 42
End: 2022-07-28
Attending: OPHTHALMOLOGY
Payer: MEDICARE

## 2022-07-28 DIAGNOSIS — H53.40 VISUAL FIELD DEFECT: ICD-10-CM

## 2022-07-28 DIAGNOSIS — H53.40 VISUAL FIELD DEFECT: Primary | ICD-10-CM

## 2022-07-28 PROCEDURE — 92133 CPTRZD OPH DX IMG PST SGM ON: CPT | Performed by: OPHTHALMOLOGY

## 2022-07-28 PROCEDURE — G0463 HOSPITAL OUTPT CLINIC VISIT: HCPCS | Performed by: TECHNICIAN/TECHNOLOGIST

## 2022-07-28 PROCEDURE — 92083 EXTENDED VISUAL FIELD XM: CPT | Performed by: OPHTHALMOLOGY

## 2022-07-28 PROCEDURE — 92014 COMPRE OPH EXAM EST PT 1/>: CPT | Mod: GC | Performed by: OPHTHALMOLOGY

## 2022-07-28 PROCEDURE — 92133 CPTRZD OPH DX IMG PST SGM ON: CPT | Mod: 26 | Performed by: OPHTHALMOLOGY

## 2022-07-28 PROCEDURE — 92083 EXTENDED VISUAL FIELD XM: CPT | Mod: 26 | Performed by: OPHTHALMOLOGY

## 2022-07-28 ASSESSMENT — VISUAL ACUITY
OD_CC: HM
METHOD: SNELLEN - LINEAR
METHOD: SNELLEN - LINEAR
OS_CC: 20/600

## 2022-07-28 ASSESSMENT — REFRACTION_WEARINGRX
OS_SPHERE: -4.25
OS_AXIS: 084
OD_ADD: +2.25
OD_SPHERE: -4.50
OD_CYLINDER: +0.75
OS_ADD: +2.25
OS_CYLINDER: +0.50
OD_AXIS: 083

## 2022-07-28 ASSESSMENT — CONF VISUAL FIELD
OD_SUPERIOR_NASAL_RESTRICTION: 3
OS_INFERIOR_NASAL_RESTRICTION: 3
OS_INFERIOR_TEMPORAL_RESTRICTION: 3
OS_SUPERIOR_TEMPORAL_RESTRICTION: 3
OD_INFERIOR_TEMPORAL_RESTRICTION: 3
METHOD: COUNTING FINGERS
OD_SUPERIOR_TEMPORAL_RESTRICTION: 3
OS_SUPERIOR_NASAL_RESTRICTION: 3
OD_INFERIOR_NASAL_RESTRICTION: 3

## 2022-07-28 ASSESSMENT — EXTERNAL EXAM - RIGHT EYE: OD_EXAM: NORMAL

## 2022-07-28 ASSESSMENT — TONOMETRY
OS_IOP_MMHG: 07
IOP_METHOD: ICARE
OD_IOP_MMHG: 07

## 2022-07-28 ASSESSMENT — EXTERNAL EXAM - LEFT EYE: OS_EXAM: NORMAL

## 2022-07-28 ASSESSMENT — SLIT LAMP EXAM - LIDS
COMMENTS: NORMAL
COMMENTS: NORMAL

## 2022-07-28 ASSESSMENT — CUP TO DISC RATIO
OD_RATIO: 0.3
OS_RATIO: 0.3

## 2022-07-28 NOTE — PROGRESS NOTES
Assessment & Plan      Alexandra Light is a 41 year old female with the following diagnoses:   1. Visual field defect       Alexandra Light presents for follow up for nonorganic vision loss. Last visit was 7/22/21. Today, she denies new vision loss, double vision, eye pain, flashes/floaters.     Her visual acuity has decreased today, now HM from 20/400 right eye and 29.600 from 20/300 left eye.      VF LVC : generalized depression in both eyes (similar to prior, MD 31.9 OD and 31.2 OS)     Stable borderline superonasal RNFL thinning with otherwise normal thickness OU, average 95/106    Overall, she is stable. She has nonorganic vision loss both eyes.       Reassurance given. Follow up 1 year sooner as needed for worsening symptoms.         Attending Physician Attestation:  Complete documentation of historical and exam elements from today's encounter can be found in the full encounter summary report (not reduplicated in this progress note).  I personally obtained the chief complaint(s) and history of present illness.  I confirmed and edited as necessary the review of systems, past medical/surgical history, family history, social history, and examination findings as documented by others; and I examined the patient myself.  I personally reviewed the relevant tests, images, and reports as documented above.  I formulated and edited as necessary the assessment and plan and discussed the findings and management plan with the patient and family. I personally reviewed the ophthalmic test(s) associated with this encounter, agree with the interpretation(s) as documented by the resident/fellow, and have edited the corresponding report(s) as necessary.  - Nain Stringer MD   PGY5

## 2023-01-07 ENCOUNTER — HEALTH MAINTENANCE LETTER (OUTPATIENT)
Age: 43
End: 2023-01-07

## 2023-05-31 ENCOUNTER — TELEPHONE (OUTPATIENT)
Dept: OPHTHALMOLOGY | Facility: CLINIC | Age: 43
End: 2023-05-31
Payer: COMMERCIAL

## 2023-05-31 NOTE — TELEPHONE ENCOUNTER
Spoke with patient regarding rescheduling appt with Dr. Danielle at [facility]. Patient is aware of updated appt time, and date. Sent new appt letter to patient.

## 2023-06-02 ENCOUNTER — HEALTH MAINTENANCE LETTER (OUTPATIENT)
Age: 43
End: 2023-06-02

## 2023-08-04 NOTE — PROGRESS NOTES
Assessment & Plan     Alexandra Light is a 42 year old female with the following diagnoses:   No diagnosis found.     Patient was last seen on 7/28/2022 for follow up of nonorganic vision loss.  At the time of last visit, her visual acuity was somewhat worse last visit but otherwise exam and testing stable. Patient states her vision has been stable since the last visit. She notes that her visual fields have felt about the same. She continues to experience blurry vision but this is consistent with where it has been in the past. She denies any symptoms of eye pain, transient vision obscurations, flashes or floaters.    OCT-NFL testing today shows consistent thickness in each eye: 92 in the right eye, 109 in the left eye (previously 95 in the right eye and 105 in the left eye on 07/28/22.    It is my impression that the patient appears to have a stable ocular exam compared to a year prior. She continues to demonstrate symptoms consistent with non-organic vision loss. Reassurance given.          Attending Physician Attestation:  Complete documentation of historical and exam elements from today's encounter can be found in the full encounter summary report (not reduplicated in this progress note).  I personally obtained the chief complaint(s) and history of present illness.  I confirmed and edited as necessary the review of systems, past medical/surgical history, family history, social history, and examination findings as documented by others; and I examined the patient myself.  I personally reviewed the relevant tests, images, and reports as documented above.  I formulated and edited as necessary the assessment and plan and discussed the findings and management plan with the patient and family. - Nain Danielle MD

## 2023-08-10 ENCOUNTER — OFFICE VISIT (OUTPATIENT)
Dept: OPHTHALMOLOGY | Facility: CLINIC | Age: 43
End: 2023-08-10
Attending: OPHTHALMOLOGY
Payer: COMMERCIAL

## 2023-08-10 DIAGNOSIS — H53.40 VISUAL FIELD DEFECT: ICD-10-CM

## 2023-08-10 DIAGNOSIS — H53.40 VISUAL FIELD DEFECT: Primary | ICD-10-CM

## 2023-08-10 PROCEDURE — 92015 DETERMINE REFRACTIVE STATE: CPT

## 2023-08-10 PROCEDURE — 92014 COMPRE OPH EXAM EST PT 1/>: CPT | Performed by: OPHTHALMOLOGY

## 2023-08-10 PROCEDURE — G0463 HOSPITAL OUTPT CLINIC VISIT: HCPCS | Performed by: OPHTHALMOLOGY

## 2023-08-10 PROCEDURE — 92133 CPTRZD OPH DX IMG PST SGM ON: CPT | Performed by: OPHTHALMOLOGY

## 2023-08-10 ASSESSMENT — REFRACTION_WEARINGRX
OS_AXIS: 084
OD_AXIS: 083
OD_CYLINDER: +0.75
OS_SPHERE: -4.25
OD_SPHERE: -4.50
OD_ADD: +2.25
OS_CYLINDER: +0.50
OS_ADD: +2.25

## 2023-08-10 ASSESSMENT — CUP TO DISC RATIO
OS_RATIO: 0.3
OD_RATIO: 0.3

## 2023-08-10 ASSESSMENT — CONF VISUAL FIELD
OD_INFERIOR_TEMPORAL_RESTRICTION: 3
OD_SUPERIOR_TEMPORAL_RESTRICTION: 3
METHOD: COUNTING FINGERS
OS_INFERIOR_TEMPORAL_RESTRICTION: 1
OS_SUPERIOR_NASAL_RESTRICTION: 3
OS_SUPERIOR_TEMPORAL_RESTRICTION: 3
OD_SUPERIOR_NASAL_RESTRICTION: 3
OD_INFERIOR_NASAL_RESTRICTION: 3
OS_INFERIOR_NASAL_RESTRICTION: 1

## 2023-08-10 ASSESSMENT — REFRACTION
OS_AXIS: 085
OD_ADD: +2.25
OS_ADD: +2.25
OS_SPHERE: -4.75
OD_SPHERE: -5.00
OD_AXIS: 085
OS_CYLINDER: +0.50
OD_CYLINDER: +0.75

## 2023-08-10 ASSESSMENT — EXTERNAL EXAM - LEFT EYE: OS_EXAM: NORMAL

## 2023-08-10 ASSESSMENT — VISUAL ACUITY
CORRECTION_TYPE: GLASSES
OS_CC: 20/600
OD_CC: CF @ 2 FT
METHOD: SNELLEN - LINEAR

## 2023-08-10 ASSESSMENT — SLIT LAMP EXAM - LIDS
COMMENTS: NORMAL
COMMENTS: NORMAL

## 2023-08-10 ASSESSMENT — TONOMETRY
OS_IOP_MMHG: 12
OD_IOP_MMHG: 10
IOP_METHOD: ICARE

## 2023-08-10 ASSESSMENT — EXTERNAL EXAM - RIGHT EYE: OD_EXAM: NORMAL

## 2023-08-10 NOTE — NURSING NOTE
Chief Complaint(s) and History of Present Illness(es)       Follow Up    In both eyes.  Since onset it is stable.  Associated symptoms include headache.  Negative for double vision, eye pain, flashes and floaters.  Pain was noted as 0/10. Additional comments: Alexandra Light is a 42 year old female with the following diagnoses:   1. Visual field defect     Alexandra reports vision remains the same as her last visit.  She gets weekly botox injections for headaches, does not currently have one right now.    PEPE Campo 8/10/2023 2:06 PM

## 2024-02-10 ENCOUNTER — HEALTH MAINTENANCE LETTER (OUTPATIENT)
Age: 44
End: 2024-02-10

## 2024-06-29 ENCOUNTER — HEALTH MAINTENANCE LETTER (OUTPATIENT)
Age: 44
End: 2024-06-29

## 2024-08-12 ENCOUNTER — OFFICE VISIT (OUTPATIENT)
Dept: OPHTHALMOLOGY | Facility: CLINIC | Age: 44
End: 2024-08-12
Attending: OPHTHALMOLOGY
Payer: COMMERCIAL

## 2024-08-12 DIAGNOSIS — H53.483 GENERALIZED CONTRACTION OF VISUAL FIELD OF BOTH EYES: ICD-10-CM

## 2024-08-12 DIAGNOSIS — F45.8 FUNCTIONAL VISUAL LOSS: Primary | ICD-10-CM

## 2024-08-12 DIAGNOSIS — H53.40 VISUAL FIELD DEFECT: Primary | ICD-10-CM

## 2024-08-12 PROCEDURE — 92133 CPTRZD OPH DX IMG PST SGM ON: CPT | Performed by: OPHTHALMOLOGY

## 2024-08-12 PROCEDURE — G0463 HOSPITAL OUTPT CLINIC VISIT: HCPCS | Performed by: OPHTHALMOLOGY

## 2024-08-12 PROCEDURE — 92014 COMPRE OPH EXAM EST PT 1/>: CPT | Mod: GC | Performed by: OPHTHALMOLOGY

## 2024-08-12 RX ORDER — PRAZOSIN HYDROCHLORIDE 5 MG/1
CAPSULE ORAL
COMMUNITY

## 2024-08-12 RX ORDER — CETIRIZINE HYDROCHLORIDE 10 MG/1
TABLET ORAL
COMMUNITY

## 2024-08-12 RX ORDER — SUVOREXANT 10 MG/1
10 TABLET, FILM COATED ORAL AT BEDTIME
COMMUNITY

## 2024-08-12 RX ORDER — FERROUS GLUCONATE 324(37.5)
TABLET ORAL
COMMUNITY
Start: 2024-05-23

## 2024-08-12 ASSESSMENT — CONF VISUAL FIELD
OS_INFERIOR_TEMPORAL_RESTRICTION: 1
OD_SUPERIOR_NASAL_RESTRICTION: 1
OS_INFERIOR_NASAL_RESTRICTION: 1
OD_INFERIOR_TEMPORAL_RESTRICTION: 1
OS_SUPERIOR_NASAL_RESTRICTION: 1
METHOD: COUNTING FINGERS
OD_SUPERIOR_TEMPORAL_RESTRICTION: 1
OD_INFERIOR_NASAL_RESTRICTION: 1
OS_SUPERIOR_TEMPORAL_RESTRICTION: 1

## 2024-08-12 ASSESSMENT — CUP TO DISC RATIO
OS_RATIO: 0.3
OD_RATIO: 0.3

## 2024-08-12 ASSESSMENT — REFRACTION_MANIFEST
OS_AXIS: 085
OD_SPHERE: -4.75
OD_CYLINDER: +0.75
OD_ADD: +2.25
OD_AXIS: 085
OS_CYLINDER: +0.50
OS_SPHERE: -4.50
OS_ADD: +2.25

## 2024-08-12 ASSESSMENT — VISUAL ACUITY
OS_CC: 20/1000
CORRECTION_TYPE: GLASSES
OD_CC: CF @ 2'
METHOD: SNELLEN - LINEAR

## 2024-08-12 ASSESSMENT — REFRACTION_WEARINGRX
OD_ADD: +2.25
OS_SPHERE: -4.25
OS_CYLINDER: +0.50
OD_SPHERE: -4.50
OD_AXIS: 083
OD_CYLINDER: +0.75
OS_ADD: +2.25
OS_AXIS: 084

## 2024-08-12 ASSESSMENT — TONOMETRY
IOP_METHOD: TONOPEN
OD_IOP_MMHG: 13
OS_IOP_MMHG: 11

## 2024-08-12 ASSESSMENT — EXTERNAL EXAM - LEFT EYE: OS_EXAM: NORMAL

## 2024-08-12 ASSESSMENT — EXTERNAL EXAM - RIGHT EYE: OD_EXAM: NORMAL

## 2024-08-12 ASSESSMENT — SLIT LAMP EXAM - LIDS
COMMENTS: NORMAL
COMMENTS: NORMAL

## 2024-08-12 NOTE — PROGRESS NOTES
Assessment & Plan     Alexandra Light is a 43 year old female with the following diagnoses:   1. Functional visual loss    2. 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 She had a stable exam last year. She reports stable vision since last visit. Maybe it is slowly getting a little harder to see things up close like reading and using a computer. She is not bothered by this. No pain.     Her VA is testing worse today. OCT RNFL today shows stable thickness; average 92 from 92 last year right eye and 105 from 109 last year left eye. Exam unchanged.     It is my impression the patient has a stable ocular exam compared to one year prior. She continues to demonstrate symptoms consistent with non-organic vision loss. She may be experiencing some presbyopia given her age and reported difficulty with near vision. Follow up 1 year sooner as needed for worsening symptoms                Attending Physician Attestation:  Complete documentation of historical and exam elements from today's encounter can be found in the full encounter summary report (not reduplicated in this progress note).  I personally obtained the chief complaint(s) and history of present illness.  I confirmed and edited as necessary the review of systems, past medical/surgical history, family history, social history, and examination findings as documented by others; and I examined the patient myself.  I personally reviewed the relevant tests, images, and reports as documented above.  I formulated and edited as necessary the assessment and plan and discussed the findings and management plan with the patient and family. I personally reviewed the ophthalmic test(s) associated with this encounter, agree with the interpretation(s) as documented by the resident/fellow, and have edited the corresponding report(s) as necessary.  - Nain Nelson  MD  Resident Physician, PGY-3  Department of Ophthalmology

## 2024-08-12 NOTE — NURSING NOTE
Chief Complaints and History of Present Illnesses   Patient presents with    Follow Up     nonorganic vision loss     Chief Complaint(s) and History of Present Illness(es)       Follow Up              Quality: blurred vision    Associated symptoms: dryness, flashes (OU) and floaters (OU).  Negative for eye pain    Treatments tried: artificial tears    Comments: nonorganic vision loss              Comments    Pt denies any vision changes.     Tiffany Talamantes OA 1:38 PM August 12, 2024

## 2025-07-13 ENCOUNTER — HEALTH MAINTENANCE LETTER (OUTPATIENT)
Age: 45
End: 2025-07-13

## 2025-08-14 ENCOUNTER — OFFICE VISIT (OUTPATIENT)
Dept: OPHTHALMOLOGY | Facility: CLINIC | Age: 45
End: 2025-08-14
Attending: OPHTHALMOLOGY
Payer: COMMERCIAL

## 2025-08-14 DIAGNOSIS — F45.8 FUNCTIONAL VISUAL LOSS: Primary | ICD-10-CM

## 2025-08-14 DIAGNOSIS — H53.483 GENERALIZED CONTRACTION OF VISUAL FIELD OF BOTH EYES: ICD-10-CM

## 2025-08-14 PROCEDURE — 92083 EXTENDED VISUAL FIELD XM: CPT | Performed by: OPHTHALMOLOGY

## 2025-08-14 PROCEDURE — G0463 HOSPITAL OUTPT CLINIC VISIT: HCPCS | Performed by: OPHTHALMOLOGY

## 2025-08-14 PROCEDURE — 92133 CPTRZD OPH DX IMG PST SGM ON: CPT | Performed by: OPHTHALMOLOGY

## 2025-08-14 ASSESSMENT — VISUAL ACUITY
OD_CC: 20/600
METHOD: SNELLEN - LINEAR
OS_CC: 20/500

## 2025-08-14 ASSESSMENT — REFRACTION_WEARINGRX
OD_CYLINDER: +0.75
OD_AXIS: 085
SPECS_TYPE: BIFOCAL
OS_ADD: +2.25
OS_AXIS: 085
OS_SPHERE: -4.50
OS_CYLINDER: +0.50
OD_SPHERE: -4.75
OD_ADD: +2.25

## 2025-08-14 ASSESSMENT — EXTERNAL EXAM - LEFT EYE: OS_EXAM: NORMAL

## 2025-08-14 ASSESSMENT — CONF VISUAL FIELD
OS_SUPERIOR_TEMPORAL_RESTRICTION: 1
OD_SUPERIOR_NASAL_RESTRICTION: 1
OD_SUPERIOR_TEMPORAL_RESTRICTION: 1
OS_SUPERIOR_NASAL_RESTRICTION: 1

## 2025-08-14 ASSESSMENT — TONOMETRY
OS_IOP_MMHG: 09
OD_IOP_MMHG: 09
IOP_METHOD: ICARE

## 2025-08-14 ASSESSMENT — SLIT LAMP EXAM - LIDS
COMMENTS: NORMAL
COMMENTS: NORMAL

## 2025-08-14 ASSESSMENT — EXTERNAL EXAM - RIGHT EYE: OD_EXAM: NORMAL

## 2025-08-14 ASSESSMENT — CUP TO DISC RATIO
OS_RATIO: 0.3
OD_RATIO: 0.3